# Patient Record
(demographics unavailable — no encounter records)

---

## 2025-06-14 NOTE — HISTORY OF PRESENT ILLNESS
[FreeTextEntry1] :  Subjective:   - Summary: Patient presents with painful right second interspace for several weeks with increasing severity. No history of trauma reported.   - Chief Complaint (CC): Painful right second interspace   - History of Present Illness (HPI): Patient reports pain in the right second interspace that has been present for several weeks. The severity of the pain has been increasing over time. There is no history of trauma associated with the condition.     Objective:   - Diagnostic Results: X-ray three views of right foot shows that the second and third metatarsal heads are in close proximity, suggesting a neuroma along with the physical findings.   - Vital Signs:    - Physical Examination (PE): DP is 2/4 bilateral. PT is 2/4 bilateral. Temperature gradient is within normal limits bilaterally. Cap return is one second times 10. There is pain upon palpation of the right second interspace. Pain upon palpation and metatarsal compression and palpation of right second interspace, consistent with a neuroma. Neurovascular status is intact bilaterally. Skin is well hydrated with good turgor.   Assessment:   - Summary: Based on the physical examination and x-ray findings, the patient is assessed to have a neuroma in the right second interspace.   - Problems:     - Neuroma right second interspace     Plan:   - Summary: The plan includes further examination, imaging, and local injection for symptom relief.   - Plan:     - Perform detailed examination of the right foot.     - Obtain X-ray three views of the right foot.     - Inject right second interspace with 1.0 cc each of Dexamethasone phosphate, cyanocobalamin, and lidocaine 2% plain.     - Follow-up to assess response to treatment and need for further interventions in three weeks.

## 2025-06-14 NOTE — PROCEDURE
[FreeTextEntry1] : X-ray 3 views right foot are positive for second and third metatarsal heads being in close proximity. Inject right third interspace with 1.0 cc each of : Dexamethasone Phosphate/2% Lidocaine plain/Cyanocobalamin.

## 2025-07-02 NOTE — PROCEDURE
[FreeTextEntry1] : Inject right second interspace with 1.0 cc each of: Cyanocobalamin/Dexamethasone Phosphate/2% Lidocaine plain.

## 2025-07-02 NOTE — HISTORY OF PRESENT ILLNESS
[FreeTextEntry1] :  Subjective:   - Summary: Patient is following up for right second interspace neuroma. The previous injection provided relief for approximately two weeks.   - Chief Complaint (CC): Follow-up for right second interspace neuroma   - History of Present Illness (HPI): The patient is presenting for a follow-up of a right second interspace neuroma. They report that the previous injection provided relief for approximately two weeks.   Objective:   - Diagnostic Results:    - Vital Signs:    - Physical Examination (PE): DP: 2/4 bilateral, PT: 2/4 bilateral. Temperature gradient is within normal limits bilaterally.  Capillary return is 1 second x 10. Pain upon palpation of the right second interspace. Pain upon medial and lateral compression and palpation consistent with an interdigital neuroma in the right second interspace.   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   - Problems:     - Interdigital neuroma, right second interspace        - Plan:  -Exam.     - Inject right second interspace with 1.0 cc each of dexamethasone phosphate, cyanocobalamin, and 2% lidocaine plain.     - Schedule follow-up appointment in three weeks.

## 2025-07-23 NOTE — HISTORY OF PRESENT ILLNESS
[FreeTextEntry1] :  Subjective:   - Summary: Patient is following up for right second interspace neuroma. They report improvement in pain after the previous injection.   - Chief Complaint (CC): Follow-up for right second interspace neuroma   - History of Present Illness (HPI): The patient is presenting for a follow-up of a right second interspace neuroma. The patient reports an improvement in pain after the previous injection.   Objective:   - Diagnostic Results:    - Vital Signs:    - Physical Examination (PE): DP: 2/4 bilateral, PT: 2/4 bilateral. Temperature gradient is within normal limits bilaterally.  Capillary return is 1 second x 10. Pain upon palpation of the right second interspace. Pain upon medial and lateral compression and palpation consistent with an interdigital neuroma in the right second interspace.   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   - Problems:     - Interdigital neuroma, right second interspace      - Plan:  -Exam.     - Inject right second interspace with 1.0 cc each of dexamethasone phosphate, cyanocobalamin, and 2% lidocaine plain.     - Schedule follow-up appointment as needed.   Entered by Alf Klein, acting as a scribe for Dr. Araujo on 07/23/2025